# Patient Record
Sex: MALE | NOT HISPANIC OR LATINO | URBAN - METROPOLITAN AREA
[De-identification: names, ages, dates, MRNs, and addresses within clinical notes are randomized per-mention and may not be internally consistent; named-entity substitution may affect disease eponyms.]

---

## 2021-12-10 ENCOUNTER — OUTPATIENT (OUTPATIENT)
Dept: EMERGENCY DEPT | Facility: HOSPITAL | Age: 56
LOS: 1 days | End: 2021-12-10
Payer: COMMERCIAL

## 2021-12-10 VITALS
WEIGHT: 170.64 LBS | SYSTOLIC BLOOD PRESSURE: 154 MMHG | HEART RATE: 88 BPM | TEMPERATURE: 98 F | DIASTOLIC BLOOD PRESSURE: 94 MMHG | HEIGHT: 70 IN | OXYGEN SATURATION: 96 % | RESPIRATION RATE: 17 BRPM

## 2021-12-10 VITALS
SYSTOLIC BLOOD PRESSURE: 147 MMHG | HEART RATE: 78 BPM | OXYGEN SATURATION: 100 % | RESPIRATION RATE: 17 BRPM | DIASTOLIC BLOOD PRESSURE: 70 MMHG

## 2021-12-10 DIAGNOSIS — H33.22 SEROUS RETINAL DETACHMENT, LEFT EYE: ICD-10-CM

## 2021-12-10 LAB
ALBUMIN SERPL ELPH-MCNC: 4.1 G/DL — SIGNIFICANT CHANGE UP (ref 3.3–5)
ALP SERPL-CCNC: 59 U/L — SIGNIFICANT CHANGE UP (ref 30–120)
ALT FLD-CCNC: 36 U/L DA — SIGNIFICANT CHANGE UP (ref 10–60)
ANION GAP SERPL CALC-SCNC: 9 MMOL/L — SIGNIFICANT CHANGE UP (ref 5–17)
AST SERPL-CCNC: 21 U/L — SIGNIFICANT CHANGE UP (ref 10–40)
BASOPHILS # BLD AUTO: 0.03 K/UL — SIGNIFICANT CHANGE UP (ref 0–0.2)
BASOPHILS NFR BLD AUTO: 0.4 % — SIGNIFICANT CHANGE UP (ref 0–2)
BILIRUB SERPL-MCNC: 1.7 MG/DL — HIGH (ref 0.2–1.2)
BUN SERPL-MCNC: 14 MG/DL — SIGNIFICANT CHANGE UP (ref 7–23)
CALCIUM SERPL-MCNC: 9.1 MG/DL — SIGNIFICANT CHANGE UP (ref 8.4–10.5)
CHLORIDE SERPL-SCNC: 101 MMOL/L — SIGNIFICANT CHANGE UP (ref 96–108)
CO2 SERPL-SCNC: 27 MMOL/L — SIGNIFICANT CHANGE UP (ref 22–31)
CREAT SERPL-MCNC: 0.96 MG/DL — SIGNIFICANT CHANGE UP (ref 0.5–1.3)
EOSINOPHIL # BLD AUTO: 0.05 K/UL — SIGNIFICANT CHANGE UP (ref 0–0.5)
EOSINOPHIL NFR BLD AUTO: 0.7 % — SIGNIFICANT CHANGE UP (ref 0–6)
GLUCOSE SERPL-MCNC: 125 MG/DL — HIGH (ref 70–99)
HCT VFR BLD CALC: 45.2 % — SIGNIFICANT CHANGE UP (ref 39–50)
HGB BLD-MCNC: 15.5 G/DL — SIGNIFICANT CHANGE UP (ref 13–17)
IMM GRANULOCYTES NFR BLD AUTO: 0.1 % — SIGNIFICANT CHANGE UP (ref 0–1.5)
LYMPHOCYTES # BLD AUTO: 1.91 K/UL — SIGNIFICANT CHANGE UP (ref 1–3.3)
LYMPHOCYTES # BLD AUTO: 25.9 % — SIGNIFICANT CHANGE UP (ref 13–44)
MCHC RBC-ENTMCNC: 30.8 PG — SIGNIFICANT CHANGE UP (ref 27–34)
MCHC RBC-ENTMCNC: 34.3 GM/DL — SIGNIFICANT CHANGE UP (ref 32–36)
MCV RBC AUTO: 89.9 FL — SIGNIFICANT CHANGE UP (ref 80–100)
MONOCYTES # BLD AUTO: 0.57 K/UL — SIGNIFICANT CHANGE UP (ref 0–0.9)
MONOCYTES NFR BLD AUTO: 7.7 % — SIGNIFICANT CHANGE UP (ref 2–14)
NEUTROPHILS # BLD AUTO: 4.81 K/UL — SIGNIFICANT CHANGE UP (ref 1.8–7.4)
NEUTROPHILS NFR BLD AUTO: 65.2 % — SIGNIFICANT CHANGE UP (ref 43–77)
NRBC # BLD: 0 /100 WBCS — SIGNIFICANT CHANGE UP (ref 0–0)
PLATELET # BLD AUTO: 271 K/UL — SIGNIFICANT CHANGE UP (ref 150–400)
POTASSIUM SERPL-MCNC: 3.4 MMOL/L — LOW (ref 3.5–5.3)
POTASSIUM SERPL-SCNC: 3.4 MMOL/L — LOW (ref 3.5–5.3)
PROT SERPL-MCNC: 7.6 G/DL — SIGNIFICANT CHANGE UP (ref 6–8.3)
RBC # BLD: 5.03 M/UL — SIGNIFICANT CHANGE UP (ref 4.2–5.8)
RBC # FLD: 11.9 % — SIGNIFICANT CHANGE UP (ref 10.3–14.5)
SARS-COV-2 RNA SPEC QL NAA+PROBE: SIGNIFICANT CHANGE UP
SODIUM SERPL-SCNC: 137 MMOL/L — SIGNIFICANT CHANGE UP (ref 135–145)
WBC # BLD: 7.38 K/UL — SIGNIFICANT CHANGE UP (ref 3.8–10.5)
WBC # FLD AUTO: 7.38 K/UL — SIGNIFICANT CHANGE UP (ref 3.8–10.5)

## 2021-12-10 PROCEDURE — 85025 COMPLETE CBC W/AUTO DIFF WBC: CPT

## 2021-12-10 PROCEDURE — 67108 REPAIR DETACHED RETINA: CPT | Mod: LT

## 2021-12-10 PROCEDURE — 80053 COMPREHEN METABOLIC PANEL: CPT

## 2021-12-10 PROCEDURE — 36415 COLL VENOUS BLD VENIPUNCTURE: CPT

## 2021-12-10 PROCEDURE — 93005 ELECTROCARDIOGRAM TRACING: CPT

## 2021-12-10 PROCEDURE — 87635 SARS-COV-2 COVID-19 AMP PRB: CPT

## 2021-12-10 PROCEDURE — C1889: CPT

## 2021-12-10 NOTE — CONSULT NOTE ADULT - SUBJECTIVE AND OBJECTIVE BOX
History of Present Illness: The patient is a 56 year old male with a history of HTN, HL who presents with visual changes. He was found to have a retinal detachment and plan is for urgent surgery. He denies chest pain or shortness of breath. No exertional symptoms. He had a colonoscopy last year without cardiac/anesthesia complications.    Past Medical/Surgical History:  HTN, HL    Medications:  Home Medications:  atorvastatin: orally once a day (10 Dec 2021 09:41)  hydrochlorothiazide-losartan: orally once a day (10 Dec 2021 09:41)      Family History: Non-contributory family history of premature cardiovascular atherosclerotic disease    Social History: No tobacco, alcohol or drug use    Review of Systems:  General: No fevers, chills, weight gain  Skin: No rashes, color changes  Cardiovascular: No chest pain, orthopnea  Respiratory: No shortness of breath, cough  Gastrointestinal: No nausea, abdominal pain  Genitourinary: No incontinence, pain with urination  Musculoskeletal: No pain, swelling, decreased range of motion  Neurological: No headache, weakness  Psychiatric: No depression, anxiety  Endocrine: No weight gain, increased thirst  All other systems are comprehensively negative.    Physical Exam:  Vitals:        Vital Signs Last 24 Hrs  T(C): 36.9 (10 Dec 2021 09:36), Max: 36.9 (10 Dec 2021 09:36)  T(F): 98.5 (10 Dec 2021 09:36), Max: 98.5 (10 Dec 2021 09:36)  HR: 88 (10 Dec 2021 09:36) (88 - 88)  BP: 154/94 (10 Dec 2021 09:36) (154/94 - 154/94)  BP(mean): --  RR: 17 (10 Dec 2021 09:36) (17 - 17)  SpO2: 96% (10 Dec 2021 09:36) (96% - 96%)  General: NAD  HEENT: MMM  Neck: No JVD, no carotid bruit  Lungs: CTAB  CV: RRR, nl S1/S2, no M/R/G  Abdomen: S/NT/ND, +BS  Extremities: No LE edema, no cyanosis  Neuro: AAOx3, non-focal  Skin: No rash    Labs:                        15.5   7.38  )-----------( 271      ( 10 Dec 2021 10:16 )             45.2     12-10    137  |  101  |  14  ----------------------------<  125<H>  3.4<L>   |  27  |  0.96    Ca    9.1      10 Dec 2021 10:16    TPro  7.6  /  Alb  4.1  /  TBili  1.7<H>  /  DBili  x   /  AST  21  /  ALT  36  /  AlkPhos  59  12-10            ECG: NSR, normal axis, no ST abnormality

## 2021-12-10 NOTE — ED PROVIDER NOTE - CLINICAL SUMMARY MEDICAL DECISION MAKING FREE TEXT BOX
Pt is a 55 yo male who presents to the ED for admission for retinal detachment of left eye. PMHx of HTN, HLD. Pt reports that he went for an eye exam on 11/1 he reports that he was seeing floaters at that time. He reports that some days his vision is WNL and other days he notes increased floaters. He also notes that over the last several days he had a "scratchy" sensation in the left eye. Pt wears glasses no contact lenses use. Denies HA, N/V CP, SOB, abd pain. Last po intake last night around 9 pm. Pt presenting for admission for operative repair for retinal detachment. Will obtain pre op and admit

## 2021-12-10 NOTE — ED PROVIDER NOTE - OBJECTIVE STATEMENT
Pt is a 55 yo male who presents to the ED for admission for retinal detachment of left eye. PMHx of HTN, HLD. Pt reports that he went for an eye exam on 11/1 he reports that he was seeing floaters at that time. He reports that some days his vision is WNL and other days he notes increased floaters. He also notes that over the last several days he had a "scratchy" sensation in the left eye. Pt wears glasses no contact lenses use. Denies HA, N/V CP, SOB, abd pain. Last po intake last night around 9 pm     PMD: David best Phizer

## 2021-12-10 NOTE — ASU DISCHARGE PLAN (ADULT/PEDIATRIC) - PATIENT BELONGINGS
Patient : Brett Stringer Age: 67 year old Sex: male   MRN: [id number] Encounter Date: 6/26/2017    O32/O32    History     Chief Complaint   Patient presents with   • Hypertension (Asymptomatic)     HPI given in part by nurse.     6/26/2017  11:22 AM Brett Stringer is a 67 year old male who presents to the ED s/p elevated BP that began PTA. The pt was in the hospital to get a neck CTA where he exhibited a high BP. The doctors there suggested going to the ED to get evaluated. The pt reported feeling slightly anxious prior to the exam, but is not anxious now. The pt's family reported that the pt felt mild dizziness while exiting his car, but does not complain of any dizziness currently. Pt denies HA, visual changes, leg pain, leg swelling, or any other pertinent complaints. The pt has a hx of HTN which he takes Metoprolol. He takes Plavix for his atrial fibrillation. The pt also has hx of dizziness with position change, a-fib, and stents in his legs. He has stopped taking ASA. The pt still smokes approximately 1.5 packs a day. There are no further complaints or modifying factors at this time.    PCP: Shayne York MD        No Known Allergies    Prior to Admission Medications    AMLODIPINE (NORVASC) 5 MG TABLET    Take 1 tablet by mouth daily.    CHOLECALCIFEROL (VITAMIN D3) 1000 UNITS TABLET    Take 1,000 Units by mouth daily.    CLOPIDOGREL (PLAVIX) 75 MG TABLET    Take 1 tablet by mouth daily.    FREESTYLE LITE    Test blood sugar 4 times daily as directed. Diagnosis: DM II. Meter: Freestyle Lite    GABAPENTIN (NEURONTIN) 300 MG CAPSULE    Take 1 capsule by mouth nightly.    GLIPIZIDE (GLUCOTROL) 5 MG TABLET    Take 2 tablets by mouth 2 times daily (before meals).    HYDROCHLOROTHIAZIDE (HYDRODIURIL) 25 MG TABLET    Take 1 tablet by mouth daily.    INSULIN GLARGINE (LANTUS SOLOSTAR) 100 UNIT/ML PEN-INJECTOR    Inject 10 Units into the skin nightly.    INSULIN PEN NEEDLE (PEN NEEDLES 3/16\") 31G X 5 MM MISC    1 each daily.     LANCETS (FREESTYLE) MISC    Test blood sugar 4 times daily as directed. Diagnosis: DM II. Meter: Freestyle Lite    LISINOPRIL (ZESTRIL) 20 MG TABLET    Take 1 tablet by mouth daily.    METOPROLOL (LOPRESSOR) 50 MG TABLET    Take 1 tablet by mouth every 12 hours.    SIMVASTATIN (ZOCOR) 20 MG TABLET    Take 1 tablet by mouth daily.    SITAGLIPTIN (JANUVIA) 100 MG TABLET    Take 1 tablet by mouth daily.       Past Medical History:   Diagnosis Date   • HTN (hypertension)     for the 5  past years   • Hyperlipidemia    • Lumbago     fell through a roof at a construction site   • PAD (peripheral artery disease) (CMS/[facility]) 6/9/2015    1. Severe atherosclerosis of the abdominal aorta with total occlusion at the level of the inferior mesenteric artery 2. Total occlusions of the common, internal and external iliac arteries bilaterally 3. Reconstitution of both femoral arteries via abdominal wall collaterals with no significant focal stenosis infra-inguinally 4. Patent stents in the right renal artery and both left renal arteries 5   • Tobacco dependence    • Type II diabetes mellitus (CMS/[facility]) diagnosed 5 yrs ago    Does not check blood sugars at home   • Wears glasses        Past Surgical History:   Procedure Laterality Date   • MOST RECENT HEMOGLOBIN A1C < 7.0%  08/22/2008    H 6.4   • MYOCARDL PERF REST STRESS  03/06/2014    Normal regadenoson. EF approx 50%   • PSA SCREENING(TOTAL)  08/22/2008    Result: 0.3   • RENAL ARTERY STENT  10/27/2014    6x18 biliary Express stent in ostium of left renal. 5x15 biliary Express stent in L renal.  6x18 biliary Express stent in ostium of right renal.   • US ABDOMEN PELVIS VASCULAR DUPLEX STUDY COMPLETE  08/29/2014    Hemodynamically significant stenosis of origin of R renal artery       Family History   Problem Relation Age of Onset   • Heart Father      MI   • NEGATIVE FAMILY HX OF Mother    • NEGATIVE FAMILY HX OF Sister    • NEGATIVE FAMILY HX OF Sister        Social History    Substance Use Topics   • Smoking status: Current Every Day Smoker     Packs/day: 1.50     Years: 50.00     Types: Cigarettes   • Smokeless tobacco: Never Used      Comment: h/o smoking 2-3 PPD in past   • Alcohol use Yes      Comment: Rare       Review of Systems   Constitutional: Negative for chills and fever.   HENT: Negative for congestion.    Eyes: Negative for photophobia and visual disturbance.   Respiratory: Negative for cough, chest tightness and shortness of breath.    Cardiovascular: Negative for chest pain and leg swelling.   Gastrointestinal: Negative for abdominal pain, diarrhea, nausea and vomiting.   Genitourinary: Negative for decreased urine volume, difficulty urinating and dysuria.   Musculoskeletal: Negative for arthralgias and myalgias.        Negative for leg pain   Skin: Negative for rash.   Neurological: Negative for weakness and headaches. Dizziness: does not currenlty feel.   Hematological: Does not bruise/bleed easily.       Physical Exam     ED Triage Vitals   ED Triage Vitals Group      Temp 06/26/17 1108 98 °F (36.7 °C)      Pulse 06/26/17 1117 63      Resp 06/26/17 1129 22      BP 06/26/17 1108 180/60      SpO2 06/26/17 1129 99 %      EtCO2 mmHg --       Height 06/26/17 1108 5' 8\" (1.727 m)      Weight 06/26/17 1108 170 lb (77.1 kg)      Weight Scale Used --        Physical Exam   Constitutional: He is oriented to person, place, and time. He appears well-developed. No distress.   HENT:   Head: Normocephalic.   Mouth/Throat: Oropharynx is clear and moist.   Eyes: Conjunctivae and EOM are normal. Pupils are equal, round, and reactive to light.   Neck: Normal range of motion. Neck supple.   Cardiovascular: Normal rate, regular rhythm and normal heart sounds.    Pulses:       Radial pulses are 2+ on the right side, and 2+ on the left side.   Pulmonary/Chest: Effort normal and breath sounds normal. No respiratory distress. He has no wheezes.   Abdominal: Soft. Bowel sounds are normal. He  exhibits no mass. There is no tenderness.   Musculoskeletal: Normal range of motion. He exhibits no edema.   Neurological: He is alert and oriented to person, place, and time. He has normal strength. No cranial nerve deficit or sensory deficit. GCS eye subscore is 4. GCS verbal subscore is 5. GCS motor subscore is 6.   Skin: Skin is warm and dry. No rash noted.   Psychiatric: He has a normal mood and affect. Thought content normal.   Nursing note and vitals reviewed.      ED Course     Procedures    Lab Results     Results for orders placed or performed during the hospital encounter of 06/26/17   CBC & Auto Differential   Result Value Ref Range    WBC 3.9 (L) 4.2 - 11.0 K/mcL    RBC 5.17 4.50 - 5.90 mil/mcL    HGB 15.0 13.0 - 17.0 g/dL    HCT 44.5 39.0 - 51.0 %    MCV 86.1 78.0 - 100.0 fl    MCH 29.0 26.0 - 34.0 pg    MCHC 33.7 32.0 - 36.5 g/dL    RDW-CV 14.8 11.0 - 15.0 %    PLT 97 (L) 140 - 450 K/mcL    DIFF TYPE AUTO DIFF verified by manual smear review.     Neutrophil 64 %    LYMPH 26 %    MONO 8 %    EOSIN 2 %    BASO 0 %    Absolute Neutrophil 2.5 1.8 - 7.7 K/mcL    Absolute Lymph 1.0 1.0 - 4.0 K/mcL    Absolute Mono 0.3 0.3 - 0.9 K/mcL    Absolute Eos 0.1 0.1 - 0.5 K/mcL    Absolute Baso 0.0 0.0 - 0.3 K/mcL    Large Platelets PRESENT    Prothrombin Time   Result Value Ref Range    PROTIME 11.9 (H) 9.7 - 11.8 sec    INR 1.1    B Type Natriuretic Peptide BNP   Result Value Ref Range    B-TYPE NATRIURETIC PEPTIDE 173 (H) <100 pg/mL   Basic Metabolic Panel   Result Value Ref Range    Sodium 133 (L) 135 - 145 mmol/L    Potassium 4.3 3.4 - 5.1 mmol/L    Chloride 99 98 - 107 mmol/L    Carbon Dioxide 26 21 - 32 mmol/L    Anion Gap 12 10 - 20 mmol/L    Glucose 136 (H) 65 - 99 mg/dL    BUN 22 (H) 6 - 20 mg/dL    Creatinine 1.28 (H) 0.67 - 1.17 mg/dL    GFR Estimate,  66     GFR Estimate, Non African American 57     BUN/Creatinine Ratio 17 7 - 25    CALCIUM 8.5 8.4 - 10.2 mg/dL   Save for Possible XMatch    Result Value Ref Range    CROSSMATCH  2017        EKG Results     EKG Interpretation  Rate: 70  Rhythm: atrial fibrillation   Abnormality: ST and T wave abnormality, consider inferior ischemia  When compared with ECG of 27-OCT-2014: a-fib has replaced sinus rhthym     EKG interpreted by ED physician    Radiology Results     Imaging Results          XR Chest AP or PA (Final result)  Result time 17 11:57:06    Final result                 Impression:    Impression:    No acute cardiopulmonary disease.               Narrative:    XR CHEST AP OR PA    CLINICAL INDICATION: hypertension.    TECHNIQUE: Portable frontal upright view of the chest obtained on 2017  11:40 AM.    COMPARISON: CT angiogram abdominal aorta with leg runoff on 4/10/2015.    FINDINGS:    The cardiomediastinal silhouette appears to be within normal limits. The  pulmonary vasculature is normally distributed. Strandy probable scarring in  the left lung base. The lungs otherwise appear clear. No pleural effusion  or pneumothorax is seen.                                ED Medication Orders     Start Ordered     Status Ordering Provider    17 1134 17 1133    ONCE      Discontinued JEANIE OCONNELL    17 1132 17 1133    PRN      Discontinued JEANIE OCONNELL    17 1132 17 1133    EVERY 15 MIN PRN      Discontinued JEANIE OCONNELL          [facility]    Vitals  Vitals:    17 1231 17 1300 17 1315 17 1330   BP: 107/51 113/72  117/56   Pulse: 54 59 56 58   Resp: 28 26 24 24   Temp:       TempSrc:       SpO2: 96% 97% 97% 97%   Weight:       Height:           ED Course    1:01 PM I rechecked with the pt. The pt is resting comfortably in bed. I informed the pt that his lab results were unremarkable. I informed the pt that I do not have the results of his CT angio yet, but everything else looked unremarkable. I discussed with patient that no further ED workup is required at this  time. Patient was given ED warnings, plan of care instructions, and follow up information to go home with. I informed the pt that he should f/u with his PCP in a week. Patient understands and agrees with plan. Any questions have been answered.    1:04 PM I spoke with Dr. Shayne York (PCP) regarding the patient's presentation and Emergency Department work up. We discussed that the pt was asymptomatic, but exhibited high BP.  We further discussed the treatment plan and collaboratively agreed that no further workup was needed.     TEAGAN West presents to the ER with asymptomatic hypertension during a outpatient ct scan. His BP is normal in the ER and he does not have symptoms. He had paroxysmal atrial fibrillation vs sinus arrhythmia however not persistent. He will follow up with Dr. York and his vascular doctor.    Critical Care time spent on this patient outside of billable procedures:  None    Clinical Impression  ED Diagnosis        Final diagnosis    Asymptomatic hypertension           The patient was provided with a recommendation to follow up with a primary care provider and obtain reassessment of his/her blood pressure within three months.    Follow Up:  Shayne York MD  [address] [facility] [address] [phone]    Call in 1 week       Pt is discharged to home/self care in stable condition.       I have reviewed the information recorded by the scribe for accuracy and agree with its contents.    ____________________________________________________________________    Donnell Ponce acting as a scribe for Dr. Yoseph Esquivel  Dictation # [id number]  Scribe: Donnell Esquivel MD  06/26/17 [id number]     Patient's belongings returned

## 2021-12-10 NOTE — ED ADULT NURSE NOTE - CHPI ED NUR SYMPTOMS NEG
no discharge/no double vision/no drainage/no eye lid swelling/no foreign body/no itching/no pain/no photophobia/no purulent drainage

## 2021-12-10 NOTE — ASU PATIENT PROFILE, ADULT - FALL HARM RISK - UNIVERSAL INTERVENTIONS
Bed in lowest position, wheels locked, appropriate side rails in place/Call bell, personal items and telephone in reach/Instruct patient to call for assistance before getting out of bed or chair/Non-slip footwear when patient is out of bed/[place] to call system/Physically safe environment - no spills, clutter or unnecessary equipment/Purposeful Proactive Rounding/Room/bathroom lighting operational, light cord in reach

## 2021-12-10 NOTE — ASU DISCHARGE PLAN (ADULT/PEDIATRIC) - CARE PROVIDER_API CALL
Ugo Cosme)  Ophthalmology  [address] [place] [address], [address]  [place] [address]  Phone: [phone]  Fax: [phone]  Scheduled Appointment: 12/11/2021

## 2021-12-10 NOTE — CONSULT NOTE ADULT - ASSESSMENT
The patient is a 56 year old male with a history of HTN, HL who presents with retina detachment, for urgent surgery.    Plan:  - ECG with no evidence of ischemia or infarction  - There are no active cardiac issues. The patient is at low risk for cardiac events for a low risk procedure. He is optimized to proceed from a cardiac standpoint.

## 2021-12-10 NOTE — ASU DISCHARGE PLAN (ADULT/PEDIATRIC) - PAIN MANAGEMENT
Writer leaves a voice message requesting Ruma to call the ENT office to confirm that she wants to cancel and reschedule the surgery that is scheduled for 11/27/19. Orders are still in Epic.    Take over the counter pain medication

## 2021-12-10 NOTE — ASU DISCHARGE PLAN (ADULT/PEDIATRIC) - NS MD DC FALL RISK RISK
For information on Fall & Injury Prevention, visit: [email or web address] OR  [email or web address] OR  https://www.cdc.gov/steadi/patient.html

## 2021-12-14 RX ORDER — LOSARTAN/HYDROCHLOROTHIAZIDE 100MG-25MG
0 TABLET ORAL
Qty: 0 | Refills: 0 | DISCHARGE

## 2021-12-14 RX ORDER — ATORVASTATIN CALCIUM 80 MG/1
0 TABLET, FILM COATED ORAL
Qty: 0 | Refills: 0 | DISCHARGE

## 2022-03-10 NOTE — ASU PREOP CHECKLIST - IV STARTED
I have confirmed the patient's and reviewed Past Medical History, Past Surgical History, Social History, Family History, Problem List, Medication List and agree with Tech note.     CC: blurry vision both eyes, Diabetes mellitus type I     HPI: had laser in the past, 2/2016 in left eye, not seen for two years due to COVID-19     Interval History: Vision stable.  Sugars have been good per patient.  Vision has been stable.     Assessment/plan:   1.  Diabetes mellitus mild nonproliferative diabetic retinopathy both eyes at this time which has improved from last visit               Blood glucose control              Diagnosed in 1992              Last HbA1C is97.6 in 12/21,  adjusted meds again per patient, sees diabetes doctor tomorrow              Monitor, no intervention needed     2. 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  Professor & Chair      IV started in ED PTA/yes

## 2025-02-12 NOTE — ED PROVIDER NOTE - EYES, MLM
Jarett Rodas (:  1961) is a 63 y.o. male,Established patient, here for evaluation of the following chief complaint(s):  Follow-up (Follow up with HERNANDO's.  )            SUBJECTIVE/OBJECTIVE:  Patient presents for follow up after an arteriogram with Aortogram with bilateral lower extremity runoff. Balloon angioplasty of left proximal SFA using 6 x 40  followed by 6 x 80 DCB  done 6 weeks ago.  Procedure was done for peripheral vascular disease with claudication. Post op problems include none.  Angiogram complications were none.    At present, he reports claudication at a distance which is extended.  Jarett reports that the right leg is equal to the left.  He reports claudication is improved . He has seen his pcp.  He continues to have left lateral foot.  We brought him in to make sure nothing with blood flow had changed      I have personally reviewed the following: problem list, current meds, allergies, PMH, PSH, family hx, and social hx  Jarett Rodas is a 63 y.o. 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[facility]) 10/02/2024    Chronic hepatitis C without hepatic coma ([facility]) 2024    PVD (peripheral vascular disease) ([facility]) 2024    Atherosclerosis of native arteries of extremities with intermittent claudication, bilateral legs ([facility]) 2023    Ischemic leg 2023    Ischemia of right lower extremity 2023    Migraine without status migrainosus, not intractable 2020     Current Outpatient Medications   Medication Sig Dispense Refill    oxyCODONE HCl (OXY-IR) 10 MG immediate release tablet Take 1 tablet by mouth every 8 hours as needed for Pain for up to 30  Clear bilaterally, pupils equal, round and reactive to light. Detailed exam deferred to optho

## 2025-06-19 NOTE — ASU PATIENT PROFILE, ADULT - TOBACCO USE
"Daily Note     Today's date: 2025  Patient name: Aroldo Leyva  : 1965  MRN: [id number]  Referring provider: Nikolay De Jesus MD  Dx:   Encounter Diagnosis     ICD-10-CM    1. Chronic midline low back pain without sciatica  M54.50     G89.29           Start Time: 1015  Stop Time: 1100  Total time in clinic (min): 45 minutes    Subjective: Reports minimal change in status to date.     Objective: See treatment diary below    Assessment: Tolerated treatment well. Patient demonstrated fatigue post treatment, exhibited good technique with therapeutic exercises, and would benefit from continued PT 1:1 with Oneal Yeh DPT for entirety of tx.     Plan: Continue per plan of care.      Precautions: HX of cervical fusion , Hx of smoking (quit in 2025)    Visit/Unit Tracking  AUTH Status:  Date 5/8 5/15 5/20 6/5 6/9 6/19   8v / 2025 Used 1 2 3 4 5 6    Remaining             Pertinent Findings:      POC End Date: 25                                                                                          Test / Measure     FOTO (Predicted 50) 41   Core mmt 2+/5   Standing tolerance 2'         Treatment Based Classification: Primary graded exposure Secondary stabilization    Manuals 5/8 5/15 5/20 6/5 6/9 6/19                                       Neuro Re-Ed         Ball crush  5\" 2x10 5\" 2x10 5\" 2x10 5\" 2x10 stand 5\" 2x10 stand   Bridges  2x15 2x15 3x15 2x10 + gtb clams    HL clam  3x10 btb 3x10 btb 3x10 btb     Dead bug  2x10 ea 3x10 ea 3x10 ea 3x10 ea 3x10 ea   Paloff Press  2x10 bhb 2x10 bhb 2x10 bhb     Paloff Rot  2x10 bhb 2x10 bhb 2x10 bhb 3x10 6# 3x10 6#   Standing hip ext      2x15 ea   Ther Ex         HEP/PN 8        NS  10' 10' 10' L5 10' L6 10' L6   LTRs  2x30 2x30 2x30 30x 30x                                                Ther Activity         Suitcase carry   nv 4x10# ea 4 laps ea 10# 4 laps ea 10#   Goblet STS    3x10 10#     Lateral band walks      5 laps btb   Front carry " "  nv 23 x10# 4 laps 10#    LSU     15x B 4\"    Gait Training                           Modalities                                        " Never smoker